# Patient Record
Sex: FEMALE | Race: WHITE | NOT HISPANIC OR LATINO | URBAN - METROPOLITAN AREA
[De-identification: names, ages, dates, MRNs, and addresses within clinical notes are randomized per-mention and may not be internally consistent; named-entity substitution may affect disease eponyms.]

---

## 2021-11-30 NOTE — PATIENT DISCUSSION
Patient presents w/ some dryness and irritation s/p four lid blepharoplasty.  No significant abnormalities were noted on today's examination.  Recommend more aggressive lubrication w/ AT drops and gels throughout the day.  Discussed the r/b of punctal plugs as well.  Patient expressed understanding and wishes to proceed.

## 2021-11-30 NOTE — PROCEDURE NOTE: CLINICAL
PROCEDURE NOTE: Punctal Plugs, Micha Corbett ([id number], [id number]) #2 Bilateral Lower Lids. Diagnosis: Lid Retraction or Lag. Prior to treatment, the risks/benefits/alternatives were discussed. The patient wished to proceed with procedure. Temporary collagen plugs were inserted. Patient tolerated procedure well. There were no complications. Post procedure instructions given. Manfred Arizmendi

## 2022-05-12 NOTE — PATIENT DISCUSSION
Co-management was discussed with the patient and the relationship between their referring optometrist and surgeon were explained. The patient understands this co-management and post-operative care with the referring optometric office. They wish to continue and proceed with this relationship.

## 2022-05-12 NOTE — PATIENT DISCUSSION
The patient feels that the cataract is significantly impacting daily activities and has elected cataract surgery. The risks, benefits, and alternatives to surgery were discussed.  The patient elects to proceed with surgery OS first then OD if symptoms persist.

## 2022-05-12 NOTE — PATIENT DISCUSSION
Astigmatism correction discussed and recommended. Patient desires to have their astigmatism corrected and will return for AVT. ORA was explained but not recommended.

## 2022-06-27 NOTE — PATIENT DISCUSSION
VISUALLY SIGNIFICANT. SCHEDULE PHACO WITH IOL OS SET FOR DISTANCE FIRST THEN OD SET FOR NEAR IF VISUAL SYMPTOMS PERSIST.

## 2022-07-27 NOTE — PATIENT DISCUSSION
Refractive Error Counseling (Declined): The patient has declined the option to have their refractive error corrected at the  time of cataract surgery. It was explained to the patient that there is a high probability that they will need glasses for  both distance and near vision. It was also explained to the patient that the decision to not have their refractive error  corrected at the time of cataract surgery is a matter of convenience, not quality of vision.

## 2022-07-27 NOTE — PATIENT DISCUSSION
Cataract surgery has been performed in the first Eye and activities of daily living are still impaired The patient would like to proceed with cataract surgery in the second eye as scheduled. The option to not proceed with the second eye has been discussed, but the patient would like to proceed. Schedule phaco with IOL OD.

## 2023-04-06 ENCOUNTER — NEW PATIENT (OUTPATIENT)
Dept: URBAN - METROPOLITAN AREA CLINIC 52 | Facility: CLINIC | Age: 53
End: 2023-04-06

## 2023-04-06 DIAGNOSIS — H25.13: ICD-10-CM

## 2023-04-06 DIAGNOSIS — H43.813: ICD-10-CM

## 2023-04-06 DIAGNOSIS — H52.4: ICD-10-CM

## 2023-04-06 DIAGNOSIS — E11.9: ICD-10-CM

## 2023-04-06 PROCEDURE — 92134 CPTRZ OPH DX IMG PST SGM RTA: CPT

## 2023-04-06 PROCEDURE — 92015 DETERMINE REFRACTIVE STATE: CPT

## 2023-04-06 PROCEDURE — 92004 COMPRE OPH EXAM NEW PT 1/>: CPT

## 2023-04-06 ASSESSMENT — VISUAL ACUITY
OS_CC: 20/20-1
OD_PH: 20/20-1
OU_CC: 20/25
OU_CC: J2@18"
OD_CC: 20/30

## 2023-04-06 ASSESSMENT — TONOMETRY
OS_IOP_MMHG: 14
OD_IOP_MMHG: 14